# Patient Record
Sex: FEMALE | Race: WHITE
[De-identification: names, ages, dates, MRNs, and addresses within clinical notes are randomized per-mention and may not be internally consistent; named-entity substitution may affect disease eponyms.]

---

## 2021-08-10 ENCOUNTER — HOSPITAL ENCOUNTER (OUTPATIENT)
Dept: HOSPITAL 47 - RADUSWWP | Age: 31
Discharge: HOME | End: 2021-08-10
Attending: INTERNAL MEDICINE
Payer: COMMERCIAL

## 2021-08-10 DIAGNOSIS — N83.202: ICD-10-CM

## 2021-08-10 DIAGNOSIS — N83.201: Primary | ICD-10-CM

## 2021-08-10 PROCEDURE — 76856 US EXAM PELVIC COMPLETE: CPT

## 2021-08-10 PROCEDURE — 76830 TRANSVAGINAL US NON-OB: CPT

## 2021-08-11 NOTE — US
EXAMINATION TYPE: US pelvis complete transvag

 

DATE OF EXAM: 8/10/2021

 

COMPARISON: US 6/15/2012

 

CLINICAL HISTORY: N95.1 post menopausal symptoms. LLQ pain 

 

TECHNIQUE: .  Transabdominal sonographic images of the pelvis were acquired.  Transvaginal sonographi
c images were medically necessary to better assess the following anatomy: Ovaries 

 

Date of LMP:  2 weeks ago 

 

EXAM MEASUREMENTS:

 

Uterus:  8.9 x 3.5 x 3.6 cm

Endometrial Stripe: 0.4 cm

Right Ovary:  3.4 x 3.5 x 2.4 cm

 

 

1. Uterus:  Anteverted   Heterogeneous. Nabothian cyst visualized. Fluid visualized within cervix 

2. Endometrium:  Fluid visualized within 

3. Right Ovary:  complex cyst visualized measuring 1.9 x 1.4 x 1.7 cm  

4. Left Ovary:  See below

**Spectral, color and waveform doppler imaging shows good arterial and venous flow within the ovaries
; there is no evidence for ovarian torsion.

5. Bilateral Adnexa:  Cystic area visualized left adnexa measuring 7.5 x 4.0 x 6.6 cm, possible left 
ovarian cyst 

6. Posterior cul-de-sac:  Small amount of free fluid visualized 

 

 

 

IMPRESSION: 

1. Bilateral ovarian cysts. This is complex on the right. Short-term follow-up in 6 weeks is recommen
ded following the next normal menstrual period.

2. Free fluid within the pelvis.

3. Small amount of fluid within the endometrial canal.

## 2021-09-28 ENCOUNTER — HOSPITAL ENCOUNTER (EMERGENCY)
Dept: HOSPITAL 47 - EC | Age: 31
Discharge: HOME | End: 2021-09-28
Payer: COMMERCIAL

## 2021-09-28 VITALS — TEMPERATURE: 98.7 F | HEART RATE: 82 BPM | DIASTOLIC BLOOD PRESSURE: 77 MMHG | SYSTOLIC BLOOD PRESSURE: 126 MMHG

## 2021-09-28 VITALS — RESPIRATION RATE: 20 BRPM

## 2021-09-28 DIAGNOSIS — U07.1: Primary | ICD-10-CM

## 2021-09-28 PROCEDURE — 96365 THER/PROPH/DIAG IV INF INIT: CPT

## 2021-09-28 PROCEDURE — 99284 EMERGENCY DEPT VISIT MOD MDM: CPT

## 2021-09-28 PROCEDURE — 87635 SARS-COV-2 COVID-19 AMP PRB: CPT

## 2021-09-28 NOTE — ED
URI HPI





- General


Chief Complaint: Upper Respiratory Infection


Stated Complaint: Covid test, Fever, lost of taste and smell


Time Seen by Provider: 09/28/21 17:40


Source: patient, RN notes reviewed


Mode of arrival: ambulatory


Limitations: no limitations





- History of Present Illness


Initial Comments: 





Patient is a 31-year-old female presenting to emergency Department with concerns

of having covid.  She states to her roommates recently tested positive.  About 3

days ago she started developing some mild lightheadedness, fatigue and then 

developed a fever and now a mild cough.  She states she feels congested.  No 

chest pains, no shortness of breath.  The last time she took Tylenol was this 

morning.  Patient has no pertinent past medical history, no history of asthma, 

she is a nonsmoker.  She denies being pregnant.  She has no further complaints 

at this time.  Upon arrival to the ER, her vitals are stable.





- Related Data


                                    Allergies











Allergy/AdvReac Type Severity Reaction Status Date / Time


 


No Known Allergies Allergy   Verified 09/28/21 16:26














Review of Systems


ROS Statement: 


Those systems with pertinent positive or pertinent negative responses have been 

documented in the HPI.





ROS Other: All systems not noted in ROS Statement are negative.





Past Medical History


Past Medical History: No Reported History


History of Any Multi-Drug Resistant Organisms: None Reported


Past Surgical History: No Surgical Hx Reported


Smoking Status: Never smoker


Past Alcohol Use History: None Reported


Past Drug Use History: None Reported





General Exam





- General Exam Comments


Initial Comments: 





GENERAL: 


Patient is well-developed and well-nourished.  Patient is nontoxic and in no 

acute distress.





HEAD: 


Atraumatic, normocephalic.





EYES:


Pupils equal round and reactive to light, extraocular movements intact, sclera 

anicteric, conjunctiva are normal.  Eyelids were unremarkable.





ENT: 


Moist mucous membranes.





NECK: 


Normal range of motion, supple without lymphadenopathy or JVD.





LUNGS:


Unlabored respirations.  Breath sounds clear to auscultation bilaterally and 

equal.  No wheezes rales or rhonchi.





HEART:


Regular rate and rhythm without murmurs, rubs or gallops.





ABDOMEN: 


Soft, nontender, normoactive bowel sounds.  No guarding, no rebound.  No masses 

appreciated.





: Deferred 





MUSCULOSKELETAL: 


Normal extremities with adequate strength and normal range of motion, no pitting

or edema.  No clubbing or cyanosis.





SKIN:


 Warm, Dry, normal turgor, no rashes or lesions noted.


Limitations: no limitations





Course





                                   Vital Signs











  09/28/21





  16:14


 


Temperature 98.9 F


 


Pulse Rate 112 H


 


Respiratory 19





Rate 


 


Blood Pressure 145/93


 


O2 Sat by Pulse 98





Oximetry 














Medical Decision Making





- Medical Decision Making





Patient is a 31-year-old female here with concerns for having Covid.  Her 

roommates recently tested positive, she started having symptoms 3 days ago.  Her

rapid test today is positive.  Her vitals are normal, her exam is unremarkable. 

Patient does meet qualifications for monoclonal antibodies, she agrees to this. 

Patient received a transfusion, no acute side effects.  She is stable for 

discharge.  Recommended Tylenol and/or Motrin for fever control.  Encourage lots

of fluids.  Return parameters were discussed with her and she verbalized 

understanding.  Case discussed with Dr. Grace.





- Lab Data





                                   Lab Results











  09/28/21 Range/Units





  16:21 


 


Coronavirus (PCR)  Detected A  (Not Detectd)  














Disposition


Clinical Impression: 


 COVID-19





Disposition: HOME SELF-CARE


Condition: Stable


Instructions (If sedation given, give patient instructions):  Coronavirus 

Disease 2019 (COVID-19)


Additional Instructions: 


Please return to the Emergency Department if symptoms worsen or any other 

concerns.


Take Tylenol and/or Motrin every 4 hours for fever control.  


Encourage lots of fluids.  


Follow-up with primary care as needed.


Is patient prescribed a controlled substance at d/c from ED?: No


Referrals: 


Solis Joseph MD [Primary Care Provider] - 1-2 days


Time of Disposition: 18:05